# Patient Record
Sex: FEMALE | URBAN - METROPOLITAN AREA
[De-identification: names, ages, dates, MRNs, and addresses within clinical notes are randomized per-mention and may not be internally consistent; named-entity substitution may affect disease eponyms.]

---

## 2022-05-11 ENCOUNTER — APPOINTMENT (RX ONLY)
Dept: URBAN - METROPOLITAN AREA CLINIC 154 | Facility: CLINIC | Age: 62
Setting detail: DERMATOLOGY
End: 2022-05-11

## 2022-05-11 ENCOUNTER — RX ONLY (OUTPATIENT)
Age: 62
Setting detail: RX ONLY
End: 2022-05-11

## 2022-05-11 VITALS
WEIGHT: 128 LBS | HEART RATE: 60 BPM | SYSTOLIC BLOOD PRESSURE: 100 MMHG | DIASTOLIC BLOOD PRESSURE: 70 MMHG | HEIGHT: 64 IN

## 2022-05-11 DIAGNOSIS — Z41.9 ENCOUNTER FOR PROCEDURE FOR PURPOSES OTHER THAN REMEDYING HEALTH STATE, UNSPECIFIED: ICD-10-CM

## 2022-05-11 PROCEDURE — ? OTHER

## 2022-05-11 RX ORDER — TESTOSTERONE
POWDER (GRAM) MISCELLANEOUS
Qty: 30 | Refills: 0 | Status: ERX | COMMUNITY
Start: 2022-05-11

## 2022-05-16 ENCOUNTER — RX ONLY (OUTPATIENT)
Age: 62
Setting detail: RX ONLY
End: 2022-05-16

## 2022-05-16 RX ORDER — PROGESTERONE 100 %
POWDER (GRAM) MISCELLANEOUS
Qty: 60 | Refills: 0 | Status: ERX | COMMUNITY
Start: 2022-05-16

## 2022-05-16 RX ORDER — TESTOSTERONE
POWDER (GRAM) MISCELLANEOUS
Qty: 60 | Refills: 0 | Status: ERX

## 2022-07-06 ENCOUNTER — APPOINTMENT (RX ONLY)
Dept: URBAN - METROPOLITAN AREA CLINIC 154 | Facility: CLINIC | Age: 62
Setting detail: DERMATOLOGY
End: 2022-07-06

## 2022-07-06 VITALS
HEART RATE: 65 BPM | HEIGHT: 64 IN | SYSTOLIC BLOOD PRESSURE: 115 MMHG | WEIGHT: 130 LBS | DIASTOLIC BLOOD PRESSURE: 70 MMHG | OXYGEN SATURATION: 97 %

## 2022-07-06 DIAGNOSIS — Z41.9 ENCOUNTER FOR PROCEDURE FOR PURPOSES OTHER THAN REMEDYING HEALTH STATE, UNSPECIFIED: ICD-10-CM

## 2022-07-06 PROCEDURE — ? OTHER

## 2022-07-18 ENCOUNTER — RX ONLY (OUTPATIENT)
Age: 62
Setting detail: RX ONLY
End: 2022-07-18

## 2022-07-18 RX ORDER — PROGESTERONE 100 %
POWDER (GRAM) MISCELLANEOUS
Qty: 90 | Refills: 0 | Status: ERX

## 2022-07-18 RX ORDER — TESTOSTERONE
POWDER (GRAM) MISCELLANEOUS
Qty: 90 | Refills: 0 | Status: ERX

## 2023-01-17 ENCOUNTER — RX ONLY (OUTPATIENT)
Age: 63
Setting detail: RX ONLY
End: 2023-01-17

## 2023-01-17 RX ORDER — PROGESTERONE 100 %
POWDER (GRAM) MISCELLANEOUS
Qty: 60 | Refills: 0 | Status: ERX

## 2023-01-17 RX ORDER — TESTOSTERONE
POWDER (GRAM) MISCELLANEOUS
Qty: 60 | Refills: 0 | Status: ERX

## 2023-03-07 ENCOUNTER — APPOINTMENT (RX ONLY)
Dept: URBAN - METROPOLITAN AREA CLINIC 154 | Facility: CLINIC | Age: 63
Setting detail: DERMATOLOGY
End: 2023-03-07

## 2023-03-07 VITALS
WEIGHT: 124 LBS | HEIGHT: 64 IN | OXYGEN SATURATION: 98 % | SYSTOLIC BLOOD PRESSURE: 100 MMHG | DIASTOLIC BLOOD PRESSURE: 72 MMHG

## 2023-03-07 DIAGNOSIS — Z41.9 ENCOUNTER FOR PROCEDURE FOR PURPOSES OTHER THAN REMEDYING HEALTH STATE, UNSPECIFIED: ICD-10-CM

## 2023-03-07 PROCEDURE — ? OTHER

## 2023-03-13 ENCOUNTER — RX ONLY (OUTPATIENT)
Age: 63
Setting detail: RX ONLY
End: 2023-03-13

## 2023-03-13 RX ORDER — PROGESTERONE 100 %
POWDER (GRAM) MISCELLANEOUS
Qty: 90 | Refills: 1 | Status: ERX

## 2023-03-28 ENCOUNTER — RX ONLY (OUTPATIENT)
Age: 63
Setting detail: RX ONLY
End: 2023-03-28

## 2023-03-28 RX ORDER — TESTOSTERONE
POWDER (GRAM) MISCELLANEOUS
Qty: 90 | Refills: 0 | Status: ERX

## 2023-08-17 ENCOUNTER — RX ONLY (OUTPATIENT)
Age: 63
Setting detail: RX ONLY
End: 2023-08-17

## 2023-08-17 RX ORDER — TESTOSTERONE
POWDER (GRAM) MISCELLANEOUS
Qty: 30 | Refills: 0 | Status: CANCELLED

## 2023-08-21 ENCOUNTER — RX ONLY (OUTPATIENT)
Age: 63
Setting detail: RX ONLY
End: 2023-08-21

## 2023-08-21 RX ORDER — PROGESTERONE 100 %
POWDER (GRAM) MISCELLANEOUS
Qty: 30 | Refills: 0 | Status: ERX

## 2023-08-21 RX ORDER — TESTOSTERONE
POWDER (GRAM) MISCELLANEOUS
Qty: 30 | Refills: 0 | Status: ERX

## 2023-09-13 ENCOUNTER — APPOINTMENT (RX ONLY)
Dept: URBAN - METROPOLITAN AREA CLINIC 154 | Facility: CLINIC | Age: 63
Setting detail: DERMATOLOGY
End: 2023-09-13

## 2023-09-13 ENCOUNTER — RX ONLY (OUTPATIENT)
Age: 63
Setting detail: RX ONLY
End: 2023-09-13

## 2023-09-13 DIAGNOSIS — Z41.9 ENCOUNTER FOR PROCEDURE FOR PURPOSES OTHER THAN REMEDYING HEALTH STATE, UNSPECIFIED: ICD-10-CM

## 2023-09-13 PROCEDURE — ? OTHER

## 2023-09-13 RX ORDER — TESTOSTERONE
POWDER (GRAM) MISCELLANEOUS
Qty: 30 | Refills: 0 | Status: ERX

## 2023-09-13 RX ORDER — ZOLPIDEM TARTRATE 10 MG/1
TABLET, FILM COATED ORAL
Qty: 30 | Refills: 0 | Status: ERX | COMMUNITY
Start: 2023-09-13

## 2023-09-20 ENCOUNTER — RX ONLY (OUTPATIENT)
Age: 63
Setting detail: RX ONLY
End: 2023-09-20

## 2023-09-20 RX ORDER — PROGESTERONE 100 %
POWDER (GRAM) MISCELLANEOUS
Qty: 90 | Refills: 0 | Status: ERX

## 2023-09-20 RX ORDER — TESTOSTERONE
POWDER (GRAM) MISCELLANEOUS
Qty: 90 | Refills: 0 | Status: ERX

## 2023-09-25 ENCOUNTER — RX ONLY (OUTPATIENT)
Age: 63
Setting detail: RX ONLY
End: 2023-09-25

## 2023-09-25 RX ORDER — TESTOSTERONE
POWDER (GRAM) MISCELLANEOUS
Qty: 90 | Refills: 0 | Status: ERX

## 2024-01-10 ENCOUNTER — RX ONLY (OUTPATIENT)
Age: 64
Setting detail: RX ONLY
End: 2024-01-10

## 2024-01-10 RX ORDER — PROGESTERONE 100 %
POWDER (GRAM) MISCELLANEOUS
Qty: 90 | Refills: 0 | Status: ERX

## 2024-02-09 ENCOUNTER — RX ONLY (OUTPATIENT)
Age: 64
Setting detail: RX ONLY
End: 2024-02-09

## 2024-02-09 RX ORDER — ZOLPIDEM TARTRATE 10 MG/1
TABLET, FILM COATED ORAL
Qty: 30 | Refills: 0 | Status: ERX

## 2024-03-26 ENCOUNTER — APPOINTMENT (RX ONLY)
Dept: URBAN - METROPOLITAN AREA CLINIC 154 | Facility: CLINIC | Age: 64
Setting detail: DERMATOLOGY
End: 2024-03-26

## 2024-03-26 ENCOUNTER — RX ONLY (OUTPATIENT)
Age: 64
Setting detail: RX ONLY
End: 2024-03-26

## 2024-03-26 VITALS
HEIGHT: 64 IN | HEART RATE: 80 BPM | SYSTOLIC BLOOD PRESSURE: 120 MMHG | OXYGEN SATURATION: 98 % | DIASTOLIC BLOOD PRESSURE: 70 MMHG | WEIGHT: 123 LBS

## 2024-03-26 DIAGNOSIS — Z41.9 ENCOUNTER FOR PROCEDURE FOR PURPOSES OTHER THAN REMEDYING HEALTH STATE, UNSPECIFIED: ICD-10-CM

## 2024-03-26 PROCEDURE — ? OTHER

## 2024-03-26 RX ORDER — TESTOSTERONE
POWDER (GRAM) MISCELLANEOUS
Qty: 30 | Refills: 0 | Status: ERX

## 2024-04-30 ENCOUNTER — RX ONLY (OUTPATIENT)
Age: 64
Setting detail: RX ONLY
End: 2024-04-30

## 2024-04-30 RX ORDER — TESTOSTERONE
POWDER (GRAM) MISCELLANEOUS
Qty: 90 | Refills: 0 | Status: ERX

## 2024-04-30 RX ORDER — PROGESTERONE 100 %
POWDER (GRAM) MISCELLANEOUS
Qty: 90 | Refills: 0 | Status: ERX

## 2024-08-05 ENCOUNTER — RX ONLY (OUTPATIENT)
Age: 64
Setting detail: RX ONLY
End: 2024-08-05

## 2024-08-05 RX ORDER — PROGESTERONE 100 %
POWDER (GRAM) MISCELLANEOUS
Qty: 90 | Refills: 0 | Status: ERX

## 2024-08-05 RX ORDER — ZOLPIDEM TARTRATE 10 MG/1
TABLET, FILM COATED ORAL
Qty: 30 | Refills: 0 | Status: ERX

## 2024-11-25 ENCOUNTER — RX ONLY (OUTPATIENT)
Age: 64
Setting detail: RX ONLY
End: 2024-11-25

## 2024-11-25 RX ORDER — PROGESTERONE 100 %
POWDER (GRAM) MISCELLANEOUS
Qty: 90 | Refills: 0 | Status: ERX

## 2025-03-12 ENCOUNTER — RX ONLY (RX ONLY)
Age: 65
End: 2025-03-12

## 2025-03-12 RX ORDER — ZOLPIDEM TARTRATE 10 MG/1
TABLET, FILM COATED ORAL
Qty: 30 | Refills: 0 | Status: ERX

## 2025-03-12 RX ORDER — PROGESTERONE 100 %
POWDER (GRAM) MISCELLANEOUS
Qty: 45 | Refills: 0 | Status: ERX

## 2025-04-14 ENCOUNTER — APPOINTMENT (OUTPATIENT)
Dept: URBAN - METROPOLITAN AREA CLINIC 154 | Facility: CLINIC | Age: 65
Setting detail: DERMATOLOGY
End: 2025-04-14

## 2025-04-14 VITALS
HEIGHT: 64 IN | SYSTOLIC BLOOD PRESSURE: 115 MMHG | OXYGEN SATURATION: 98 % | DIASTOLIC BLOOD PRESSURE: 72 MMHG | WEIGHT: 127 LBS | HEART RATE: 58 BPM

## 2025-04-14 DIAGNOSIS — Z41.9 ENCOUNTER FOR PROCEDURE FOR PURPOSES OTHER THAN REMEDYING HEALTH STATE, UNSPECIFIED: ICD-10-CM

## 2025-04-14 PROCEDURE — ? OTHER

## 2025-04-14 NOTE — PROCEDURE: OTHER
Other (Free Text): 64 yr.old female here to continue hormones. She is doing well. She feels great no vaginal dryness or HF/NS. She had 3 months ago restarted testosterone and she can tell her energy level is great. She took it this morning but it’s past the 5 hour that we would like to get around and she would rather come back in Thursday to get that 5 hr timeframe. She also continues with [facility] for sleep. Once we get labs [facility] will respond thru her chart. I will email labs to her. Deanna Dickey MA
Detail Level: Zone
Render Risk Assessment In Note?: no
Note Text (......Xxx Chief Complaint.): This diagnosis correlates with the

## 2025-04-17 ENCOUNTER — RX ONLY (RX ONLY)
Age: 65
End: 2025-04-17

## 2025-04-17 RX ORDER — PROGESTERONE 100 %
POWDER (GRAM) MISCELLANEOUS
Qty: 30 | Refills: 0 | Status: ERX

## 2025-04-17 RX ORDER — TESTOSTERONE
POWDER (GRAM) MISCELLANEOUS
Qty: 30 | Refills: 0 | Status: CANCELLED

## 2025-06-16 ENCOUNTER — RX ONLY (RX ONLY)
Age: 65
End: 2025-06-16

## 2025-06-16 RX ORDER — TESTOSTERONE
POWDER (GRAM) MISCELLANEOUS
Qty: 90 | Refills: 0 | Status: ERX

## 2025-06-16 RX ORDER — PROGESTERONE 100 %
POWDER (GRAM) MISCELLANEOUS
Qty: 90 | Refills: 1 | Status: ERX